# Patient Record
Sex: FEMALE | URBAN - METROPOLITAN AREA
[De-identification: names, ages, dates, MRNs, and addresses within clinical notes are randomized per-mention and may not be internally consistent; named-entity substitution may affect disease eponyms.]

---

## 2019-02-04 ENCOUNTER — LAB REQUISITION (OUTPATIENT)
Dept: LAB | Facility: HOSPITAL | Age: 24
End: 2019-02-04
Payer: COMMERCIAL

## 2019-02-04 DIAGNOSIS — Z34.83 ENCOUNTER FOR SUPERVISION OF OTHER NORMAL PREGNANCY, THIRD TRIMESTER: ICD-10-CM

## 2019-02-04 PROCEDURE — 87653 STREP B DNA AMP PROBE: CPT | Performed by: OBSTETRICS & GYNECOLOGY

## 2019-02-06 LAB — GP B STREP DNA SPEC QL NAA+PROBE: NORMAL

## 2019-02-27 ENCOUNTER — HOSPITAL ENCOUNTER (OUTPATIENT)
Facility: HOSPITAL | Age: 24
Discharge: HOME | End: 2019-02-27
Attending: OBSTETRICS & GYNECOLOGY | Admitting: OBSTETRICS & GYNECOLOGY
Payer: COMMERCIAL

## 2019-02-27 VITALS — HEART RATE: 81 BPM | DIASTOLIC BLOOD PRESSURE: 69 MMHG | TEMPERATURE: 98.3 F | SYSTOLIC BLOOD PRESSURE: 126 MMHG

## 2019-02-27 PROBLEM — O99.210 MATERNAL MORBID OBESITY, ANTEPARTUM (HCC): Status: ACTIVE | Noted: 2018-07-20

## 2019-02-27 PROBLEM — E66.01 MATERNAL MORBID OBESITY, ANTEPARTUM (HCC): Status: ACTIVE | Noted: 2018-07-20

## 2019-02-27 PROBLEM — O09.299 PREVIOUS STILLBIRTH OR DEMISE, ANTEPARTUM: Status: ACTIVE | Noted: 2018-07-20

## 2019-02-27 PROBLEM — Z3A.38 38 WEEKS GESTATION OF PREGNANCY: Status: ACTIVE | Noted: 2019-02-27

## 2019-02-27 LAB
ALBUMIN SERPL BCP-MCNC: 2.7 G/DL (ref 3.5–5)
ALP SERPL-CCNC: 101 U/L (ref 46–116)
ALT SERPL W P-5'-P-CCNC: 16 U/L (ref 12–78)
ANION GAP SERPL CALCULATED.3IONS-SCNC: 14 MMOL/L (ref 4–13)
AST SERPL W P-5'-P-CCNC: 17 U/L (ref 5–45)
BILIRUB SERPL-MCNC: 0.51 MG/DL (ref 0.2–1)
BILIRUB UR QL STRIP: NEGATIVE
BUN SERPL-MCNC: 8 MG/DL (ref 5–25)
CALCIUM SERPL-MCNC: 9.2 MG/DL (ref 8.3–10.1)
CHLORIDE SERPL-SCNC: 104 MMOL/L (ref 100–108)
CLARITY UR: NORMAL
CO2 SERPL-SCNC: 21 MMOL/L (ref 21–32)
COLOR UR: YELLOW
CREAT SERPL-MCNC: 0.58 MG/DL (ref 0.6–1.3)
CREAT UR-MCNC: 120 MG/DL
ERYTHROCYTE [DISTWIDTH] IN BLOOD BY AUTOMATED COUNT: 14.2 % (ref 11.6–15.1)
GFR SERPL CREATININE-BSD FRML MDRD: 129 ML/MIN/1.73SQ M
GLUCOSE SERPL-MCNC: 75 MG/DL (ref 65–140)
GLUCOSE UR STRIP-MCNC: NEGATIVE MG/DL
HCT VFR BLD AUTO: 39 % (ref 34.8–46.1)
HGB BLD-MCNC: 13 G/DL (ref 11.5–15.4)
HGB UR QL STRIP.AUTO: NEGATIVE
KETONES UR STRIP-MCNC: NEGATIVE MG/DL
LEUKOCYTE ESTERASE UR QL STRIP: NEGATIVE
MCH RBC QN AUTO: 27.8 PG (ref 26.8–34.3)
MCHC RBC AUTO-ENTMCNC: 33.3 G/DL (ref 31.4–37.4)
MCV RBC AUTO: 83 FL (ref 82–98)
NITRITE UR QL STRIP: NEGATIVE
PH UR STRIP.AUTO: 7 [PH] (ref 4.5–8)
PLATELET # BLD AUTO: 325 THOUSANDS/UL (ref 149–390)
PMV BLD AUTO: 10.3 FL (ref 8.9–12.7)
POTASSIUM SERPL-SCNC: 3.6 MMOL/L (ref 3.5–5.3)
PROT SERPL-MCNC: 6.9 G/DL (ref 6.4–8.2)
PROT UR STRIP-MCNC: NEGATIVE MG/DL
PROT UR-MCNC: 14 MG/DL
PROT/CREAT UR: 0.12 MG/G{CREAT} (ref 0–0.1)
RBC # BLD AUTO: 4.68 MILLION/UL (ref 3.81–5.12)
SODIUM SERPL-SCNC: 139 MMOL/L (ref 136–145)
SP GR UR STRIP.AUTO: 1.02 (ref 1–1.03)
UROBILINOGEN UR QL STRIP.AUTO: 0.2 E.U./DL
WBC # BLD AUTO: 10.94 THOUSAND/UL (ref 4.31–10.16)

## 2019-02-27 PROCEDURE — 85027 COMPLETE CBC AUTOMATED: CPT | Performed by: STUDENT IN AN ORGANIZED HEALTH CARE EDUCATION/TRAINING PROGRAM

## 2019-02-27 PROCEDURE — 82570 ASSAY OF URINE CREATININE: CPT | Performed by: STUDENT IN AN ORGANIZED HEALTH CARE EDUCATION/TRAINING PROGRAM

## 2019-02-27 PROCEDURE — G0463 HOSPITAL OUTPT CLINIC VISIT: HCPCS

## 2019-02-27 PROCEDURE — 84156 ASSAY OF PROTEIN URINE: CPT | Performed by: STUDENT IN AN ORGANIZED HEALTH CARE EDUCATION/TRAINING PROGRAM

## 2019-02-27 PROCEDURE — 80053 COMPREHEN METABOLIC PANEL: CPT | Performed by: STUDENT IN AN ORGANIZED HEALTH CARE EDUCATION/TRAINING PROGRAM

## 2019-02-27 PROCEDURE — 99213 OFFICE O/P EST LOW 20 MIN: CPT

## 2019-02-27 PROCEDURE — 81003 URINALYSIS AUTO W/O SCOPE: CPT | Performed by: STUDENT IN AN ORGANIZED HEALTH CARE EDUCATION/TRAINING PROGRAM

## 2019-02-27 NOTE — PROGRESS NOTES
Triage Note - OB  Rosalinda Olguin 20 y o  female MRN: [id number]  Unit/Bed#: L&D 323-01 Encounter: [id number]    Chief Complaint: Contraction pain and elevated blood pressures in office  KIM: Estimated Date of Delivery: None noted  HPI: Patient is a  at 38 weeks and 5 days by LMP presenting to triage after being sent from office for elevated blood pressures with diastolics in the 84N  Patient denies any current headaches but reports she did have a headache this past weekend  She denies any visual disturbance/scotoma, right upper quadrant or epigastric pain, chest pain, shortness of breath, nausea, vomiting, hand or face swelling  She reports she began having contractions around 3:00 p m  that she feels are every 2-3 minutes apart  She was checked in office and was noted to be 3/60/-4  She denies any loss of fluid or vaginal bleeding  She reports good fetal movement  Mal Calvert is a patient of Dr Kinza Khanna and has an obstetric history complicated by IUFD at 21 weeks and morbid obesity  Vitals:   /67   Pulse 76   Temp 98 3 °F (36 8 °C)   LMP 2018   There is no height or weight on file to calculate BMI      Physical Exam  GEN:  Appears calm, uncomfortable with contractions, no acute distress  CARDIO: RRR, no murmur, no gallop  RESP:  Clear to auscultation bilaterally, good air entry in all fields  ABD:  Soft, nontender, gravid uterus, palpable contractions  Neuro:  DTRs +1, negative for clonus  SVE: Dilation: 3  Effacement (%): 60  Cervical Characteristics: Anterior  Station: -2  Presentation: Vertex  Method: Manual  OB Examiner: Ekonomidis    FHT:  Baseline Rate: 140 bpm  Variability: Moderate 6-25 bpm  Accelerations: 15 x 15 or greater, At variable times  Decelerations: None  FHR Category: Category I  TOCO:   Contraction Frequency (minutes): 1 5-2 5  Contraction Duration (seconds):   Contraction Quality: Mild    Labs:   Recent Results (from the past 24 hour(s))   CBC and Platelet Collection Time: 19  6:02 PM   Result Value Ref Range    WBC 10 94 (H) 4 31 - 10 16 Thousand/uL    RBC 4 68 3 81 - 5 12 Million/uL    Hemoglobin 13 0 11 5 - 15 4 g/dL    Hematocrit 39 0 34 8 - 46 1 %    MCV 83 82 - 98 fL    MCH 27 8 26 8 - 34 3 pg    MCHC 33 3 31 4 - 37 4 g/dL    RDW 14 2 11 6 - 15 1 %    Platelets 779 650 - 950 Thousands/uL    MPV 10 3 8 9 - 12 7 fL   Comprehensive metabolic panel    Collection Time: 19  6:02 PM   Result Value Ref Range    Sodium 139 136 - 145 mmol/L    Potassium 3 6 3 5 - 5 3 mmol/L    Chloride 104 100 - 108 mmol/L    CO2 21 21 - 32 mmol/L    ANION GAP 14 (H) 4 - 13 mmol/L    BUN 8 5 - 25 mg/dL    Creatinine 0 58 (L) 0 60 - 1 30 mg/dL    Glucose 75 65 - 140 mg/dL    Calcium 9 2 8 3 - 10 1 mg/dL    AST 17 5 - 45 U/L    ALT 16 12 - 78 U/L    Alkaline Phosphatase 101 46 - 116 U/L    Total Protein 6 9 6 4 - 8 2 g/dL    Albumin 2 7 (L) 3 5 - 5 0 g/dL    Total Bilirubin 0 51 0 20 - 1 00 mg/dL    eGFR 129 ml/min/1 73sq m   UA (URINE) with reflex to Microscopic    Collection Time: 19  6:02 PM   Result Value Ref Range    Color, UA Yellow     Clarity, UA Slightly Cloudy     Specific [place], UA 1 020 1 003 - 1 030    pH, UA 7 0 4 5 - 8 0    Leukocytes, UA Negative Negative    Nitrite, UA Negative Negative    Protein, UA Negative Negative mg/dl    Glucose, UA Negative Negative mg/dl    Ketones, UA Negative Negative mg/dl    Urobilinogen, UA 0 2 0 2, 1 0 E U /dl E U /dl    Bilirubin, UA Negative Negative    Blood, UA Negative Negative     Lab, Imaging and other studies: I have personally reviewed pertinent reports      A/P:  68-year-old  120 at 38 weeks and 5 days presenting for rule out preeclampsia and rule out labor  1) Rule out preeclampsia   - Blood pressures Q 30 minutes:  Bps: 120-130s/60-70s   - Preeclamptic labs:  CBC, CMP, UA, protein creatinine ratio  2) Rule out labor   - eFHM and toco   - minimal cervical change from office check- will recheck in 2 hours given frequent contractions and discomfort patient  Windy Reese MD  2/27/2019  6:57 PM    Reassessment at 2100:  Patient continues to report contraction pain  FHT reactive with baseline of 140, modearte variability and 15x15 accelerations, no decelerations noted  Cervix is unchanged from last cervical check  BP taken and was 126/ 69  Discussed labor precautions and to please call her provider with any changes  Discharge instructions given to patient and patient told to call office to reschedule appointment to be seen early next week    D/W Dr Tran Chamorro MD  02/27/19  9:11 PM

## 2019-03-01 ENCOUNTER — ANESTHESIA EVENT (INPATIENT)
Dept: LABOR AND DELIVERY | Facility: HOSPITAL | Age: 24
End: 2019-03-01
Payer: COMMERCIAL

## 2019-03-01 ENCOUNTER — HOSPITAL ENCOUNTER (INPATIENT)
Facility: HOSPITAL | Age: 24
LOS: 2 days | Discharge: HOME | End: 2019-03-03
Attending: OBSTETRICS & GYNECOLOGY | Admitting: OBSTETRICS & GYNECOLOGY
Payer: COMMERCIAL

## 2019-03-01 ENCOUNTER — ANESTHESIA (INPATIENT)
Dept: LABOR AND DELIVERY | Facility: HOSPITAL | Age: 24
End: 2019-03-01
Payer: COMMERCIAL

## 2019-03-01 DIAGNOSIS — Z3A.39 39 WEEKS GESTATION OF PREGNANCY: ICD-10-CM

## 2019-03-01 PROBLEM — J45.20 MILD INTERMITTENT ASTHMA: Status: ACTIVE | Noted: 2019-03-01

## 2019-03-01 LAB
ABO GROUP BLD: NORMAL
BASE EXCESS BLDCOA CALC-SCNC: 0.2 MMOL/L (ref 3–11)
BASE EXCESS BLDCOV CALC-SCNC: -2.5 MMOL/L (ref 1–9)
BASOPHILS # BLD AUTO: 0.04 THOUSANDS/ΜL (ref 0–0.1)
BASOPHILS NFR BLD AUTO: 0 % (ref 0–1)
BLD GP AB SCN SERPL QL: NEGATIVE
EOSINOPHIL # BLD AUTO: 0.1 THOUSAND/ΜL (ref 0–0.61)
EOSINOPHIL NFR BLD AUTO: 1 % (ref 0–6)
ERYTHROCYTE [DISTWIDTH] IN BLOOD BY AUTOMATED COUNT: 14.3 % (ref 11.6–15.1)
HCO3 BLDCOA-SCNC: 24.9 MMOL/L (ref 17.3–27.3)
HCO3 BLDCOV-SCNC: 20.1 MMOL/L (ref 12.2–28.6)
HCT VFR BLD AUTO: 39.3 % (ref 34.8–46.1)
HGB BLD-MCNC: 12.8 G/DL (ref 11.5–15.4)
IMM GRANULOCYTES # BLD AUTO: 0.05 THOUSAND/UL (ref 0–0.2)
IMM GRANULOCYTES NFR BLD AUTO: 1 % (ref 0–2)
LYMPHOCYTES # BLD AUTO: 2.24 THOUSANDS/ΜL (ref 0.6–4.47)
LYMPHOCYTES NFR BLD AUTO: 22 % (ref 14–44)
MCH RBC QN AUTO: 26.8 PG (ref 26.8–34.3)
MCHC RBC AUTO-ENTMCNC: 32.6 G/DL (ref 31.4–37.4)
MCV RBC AUTO: 82 FL (ref 82–98)
MONOCYTES # BLD AUTO: 0.97 THOUSAND/ΜL (ref 0.17–1.22)
MONOCYTES NFR BLD AUTO: 10 % (ref 4–12)
NEUTROPHILS # BLD AUTO: 6.63 THOUSANDS/ΜL (ref 1.85–7.62)
NEUTS SEG NFR BLD AUTO: 66 % (ref 43–75)
NRBC BLD AUTO-RTO: 0 /100 WBCS
O2 CT VFR BLDCOA CALC: 10.1 ML/DL
OXYHGB MFR BLDCOA: 54.2 %
OXYHGB MFR BLDCOV: 70.2 %
PCO2 BLDCOA: 41 MM[HG] (ref 30–60)
PCO2 BLDCOV: 29.2 MM HG (ref 27–43)
PH BLDCOA: 7.4 [PH] (ref 7.23–7.43)
PH BLDCOV: 7.46 [PH] (ref 7.19–7.49)
PLATELET # BLD AUTO: 348 THOUSANDS/UL (ref 149–390)
PMV BLD AUTO: 10.4 FL (ref 8.9–12.7)
PO2 BLDCOA: 23.3 MM HG (ref 5–25)
PO2 BLDCOV: 28.4 MM HG (ref 15–45)
RBC # BLD AUTO: 4.78 MILLION/UL (ref 3.81–5.12)
RH BLD: POSITIVE
RPR SER QL: NORMAL
SAO2 % BLDCOV: 14.2 ML/DL
SPECIMEN EXPIRATION DATE: NORMAL
WBC # BLD AUTO: 10.03 THOUSAND/UL (ref 4.31–10.16)

## 2019-03-01 PROCEDURE — 86850 RBC ANTIBODY SCREEN: CPT | Performed by: STUDENT IN AN ORGANIZED HEALTH CARE EDUCATION/TRAINING PROGRAM

## 2019-03-01 PROCEDURE — 10H07YZ INSERTION OF OTHER DEVICE INTO PRODUCTS OF CONCEPTION, VIA NATURAL OR ARTIFICIAL OPENING: ICD-10-PCS | Performed by: OBSTETRICS & GYNECOLOGY

## 2019-03-01 PROCEDURE — 99213 OFFICE O/P EST LOW 20 MIN: CPT

## 2019-03-01 PROCEDURE — 85025 COMPLETE CBC W/AUTO DIFF WBC: CPT | Performed by: STUDENT IN AN ORGANIZED HEALTH CARE EDUCATION/TRAINING PROGRAM

## 2019-03-01 PROCEDURE — 4A1HXCZ MONITORING OF PRODUCTS OF CONCEPTION, CARDIAC RATE, EXTERNAL APPROACH: ICD-10-PCS | Performed by: OBSTETRICS & GYNECOLOGY

## 2019-03-01 PROCEDURE — 0KQM0ZZ REPAIR PERINEUM MUSCLE, OPEN APPROACH: ICD-10-PCS | Performed by: OBSTETRICS & GYNECOLOGY

## 2019-03-01 PROCEDURE — 86900 BLOOD TYPING SEROLOGIC ABO: CPT | Performed by: STUDENT IN AN ORGANIZED HEALTH CARE EDUCATION/TRAINING PROGRAM

## 2019-03-01 PROCEDURE — G0463 HOSPITAL OUTPT CLINIC VISIT: HCPCS

## 2019-03-01 PROCEDURE — 86592 SYPHILIS TEST NON-TREP QUAL: CPT | Performed by: STUDENT IN AN ORGANIZED HEALTH CARE EDUCATION/TRAINING PROGRAM

## 2019-03-01 PROCEDURE — 82805 BLOOD GASES W/O2 SATURATION: CPT | Performed by: OBSTETRICS & GYNECOLOGY

## 2019-03-01 PROCEDURE — 86901 BLOOD TYPING SEROLOGIC RH(D): CPT | Performed by: STUDENT IN AN ORGANIZED HEALTH CARE EDUCATION/TRAINING PROGRAM

## 2019-03-01 RX ORDER — DIAPER,BRIEF,INFANT-TODD,DISP
1 EACH MISCELLANEOUS AS NEEDED
Status: DISCONTINUED | OUTPATIENT
Start: 2019-03-01 | End: 2019-03-04 | Stop reason: HOSPADM

## 2019-03-01 RX ORDER — ROPIVACAINE HYDROCHLORIDE 2 MG/ML
INJECTION, SOLUTION EPIDURAL; INFILTRATION; PERINEURAL AS NEEDED
Status: DISCONTINUED | OUTPATIENT
Start: 2019-03-01 | End: 2019-03-01 | Stop reason: SURG

## 2019-03-01 RX ORDER — OXYTOCIN/RINGER'S LACTATE 30/500 ML
250 PLASTIC BAG, INJECTION (ML) INTRAVENOUS
Status: ACTIVE | OUTPATIENT
Start: 2019-03-01 | End: 2019-03-01

## 2019-03-01 RX ORDER — ONDANSETRON 2 MG/ML
4 INJECTION INTRAMUSCULAR; INTRAVENOUS EVERY 8 HOURS PRN
Status: DISCONTINUED | OUTPATIENT
Start: 2019-03-01 | End: 2019-03-04 | Stop reason: HOSPADM

## 2019-03-01 RX ORDER — OXYTOCIN/RINGER'S LACTATE 30/500 ML
1-30 PLASTIC BAG, INJECTION (ML) INTRAVENOUS
Status: DISCONTINUED | OUTPATIENT
Start: 2019-03-01 | End: 2019-03-01

## 2019-03-01 RX ORDER — ROPIVACAINE HYDROCHLORIDE 2 MG/ML
INJECTION, SOLUTION EPIDURAL; INFILTRATION; PERINEURAL
Status: COMPLETED
Start: 2019-03-01 | End: 2019-03-01

## 2019-03-01 RX ORDER — SIMETHICONE 80 MG
80 TABLET,CHEWABLE ORAL 4 TIMES DAILY PRN
Status: DISCONTINUED | OUTPATIENT
Start: 2019-03-01 | End: 2019-03-04 | Stop reason: HOSPADM

## 2019-03-01 RX ORDER — IBUPROFEN 600 MG/1
600 TABLET ORAL EVERY 6 HOURS PRN
Status: DISCONTINUED | OUTPATIENT
Start: 2019-03-01 | End: 2019-03-04 | Stop reason: HOSPADM

## 2019-03-01 RX ORDER — ACETAMINOPHEN 325 MG/1
650 TABLET ORAL EVERY 6 HOURS PRN
Status: DISCONTINUED | OUTPATIENT
Start: 2019-03-01 | End: 2019-03-04 | Stop reason: HOSPADM

## 2019-03-01 RX ORDER — OXYCODONE HYDROCHLORIDE AND ACETAMINOPHEN 5; 325 MG/1; MG/1
2 TABLET ORAL EVERY 4 HOURS PRN
Status: DISCONTINUED | OUTPATIENT
Start: 2019-03-01 | End: 2019-03-02

## 2019-03-01 RX ORDER — DIPHENHYDRAMINE HCL 25 MG
25 TABLET ORAL EVERY 6 HOURS PRN
Status: DISCONTINUED | OUTPATIENT
Start: 2019-03-01 | End: 2019-03-04 | Stop reason: HOSPADM

## 2019-03-01 RX ORDER — CALCIUM CARBONATE 200(500)MG
1000 TABLET,CHEWABLE ORAL DAILY PRN
Status: DISCONTINUED | OUTPATIENT
Start: 2019-03-01 | End: 2019-03-04 | Stop reason: HOSPADM

## 2019-03-01 RX ORDER — OXYTOCIN/RINGER'S LACTATE 30/500 ML
PLASTIC BAG, INJECTION (ML) INTRAVENOUS
Status: COMPLETED
Start: 2019-03-01 | End: 2019-03-01

## 2019-03-01 RX ORDER — SODIUM CHLORIDE, SODIUM LACTATE, POTASSIUM CHLORIDE, CALCIUM CHLORIDE 600; 310; 30; 20 MG/100ML; MG/100ML; MG/100ML; MG/100ML
125 INJECTION, SOLUTION INTRAVENOUS CONTINUOUS
Status: DISCONTINUED | OUTPATIENT
Start: 2019-03-01 | End: 2019-03-04 | Stop reason: HOSPADM

## 2019-03-01 RX ORDER — DOCUSATE SODIUM 100 MG/1
100 CAPSULE, LIQUID FILLED ORAL 2 TIMES DAILY
Status: DISCONTINUED | OUTPATIENT
Start: 2019-03-01 | End: 2019-03-04 | Stop reason: HOSPADM

## 2019-03-01 RX ORDER — ALBUTEROL SULFATE 90 UG/1
2 AEROSOL, METERED RESPIRATORY (INHALATION) EVERY 6 HOURS PRN
Status: DISCONTINUED | OUTPATIENT
Start: 2019-03-01 | End: 2019-03-04 | Stop reason: HOSPADM

## 2019-03-01 RX ORDER — OXYCODONE HYDROCHLORIDE AND ACETAMINOPHEN 5; 325 MG/1; MG/1
1 TABLET ORAL EVERY 4 HOURS PRN
Status: DISCONTINUED | OUTPATIENT
Start: 2019-03-01 | End: 2019-03-02

## 2019-03-01 RX ORDER — ONDANSETRON 2 MG/ML
4 INJECTION INTRAMUSCULAR; INTRAVENOUS EVERY 6 HOURS PRN
Status: DISCONTINUED | OUTPATIENT
Start: 2019-03-01 | End: 2019-03-01

## 2019-03-01 RX ORDER — LIDOCAINE HYDROCHLORIDE 10 MG/ML
INJECTION, SOLUTION INFILTRATION; PERINEURAL
Status: COMPLETED | OUTPATIENT
Start: 2019-03-01 | End: 2019-03-01

## 2019-03-01 RX ORDER — TERBUTALINE SULFATE 1 MG/ML
INJECTION, SOLUTION SUBCUTANEOUS
Status: DISPENSED
Start: 2019-03-01 | End: 2019-03-01

## 2019-03-01 RX ORDER — ROPIVACAINE HYDROCHLORIDE 5 MG/ML
INJECTION, SOLUTION EPIDURAL; INFILTRATION; PERINEURAL AS NEEDED
Status: DISCONTINUED | OUTPATIENT
Start: 2019-03-01 | End: 2019-03-01 | Stop reason: SURG

## 2019-03-01 RX ADMIN — IBUPROFEN 600 MG: 600 TABLET ORAL at 19:02

## 2019-03-01 RX ADMIN — SODIUM CHLORIDE, SODIUM LACTATE, POTASSIUM CHLORIDE, AND CALCIUM CHLORIDE 999 ML/HR: .6; .31; .03; .02 INJECTION, SOLUTION INTRAVENOUS at 10:54

## 2019-03-01 RX ADMIN — WITCH HAZEL 1 PAD: 500 SOLUTION RECTAL; TOPICAL at 19:39

## 2019-03-01 RX ADMIN — ROPIVACAINE HYDROCHLORIDE 5 ML: 2 INJECTION, SOLUTION EPIDURAL; INFILTRATION at 06:46

## 2019-03-01 RX ADMIN — HYDROCORTISONE 1 APPLICATION: 1 CREAM TOPICAL at 19:39

## 2019-03-01 RX ADMIN — ROPIVACAINE HYDROCHLORIDE: 2 INJECTION, SOLUTION EPIDURAL; INFILTRATION at 06:56

## 2019-03-01 RX ADMIN — SODIUM CHLORIDE, SODIUM LACTATE, POTASSIUM CHLORIDE, AND CALCIUM CHLORIDE 999 ML/HR: .6; .31; .03; .02 INJECTION, SOLUTION INTRAVENOUS at 05:51

## 2019-03-01 RX ADMIN — BENZOCAINE AND LEVOMENTHOL: 200; 5 SPRAY TOPICAL at 19:39

## 2019-03-01 RX ADMIN — ROPIVACAINE HYDROCHLORIDE 5 ML: 2 INJECTION, SOLUTION EPIDURAL; INFILTRATION at 06:49

## 2019-03-01 RX ADMIN — LIDOCAINE HYDROCHLORIDE 2 ML: 10 INJECTION, SOLUTION INFILTRATION; PERINEURAL at 06:55

## 2019-03-01 RX ADMIN — Medication 2 MILLI-UNITS/MIN: at 08:31

## 2019-03-01 RX ADMIN — ROPIVACAINE HYDROCHLORIDE 6 ML: 5 INJECTION, SOLUTION EPIDURAL; INFILTRATION; PERINEURAL at 10:44

## 2019-03-01 RX ADMIN — SODIUM CHLORIDE, SODIUM LACTATE, POTASSIUM CHLORIDE, AND CALCIUM CHLORIDE 999 ML/HR: .6; .31; .03; .02 INJECTION, SOLUTION INTRAVENOUS at 06:37

## 2019-03-01 NOTE — PLAN OF CARE
Problem: POSTPARTUM  Goal: Experiences normal postpartum course  Description  INTERVENTIONS:  - Monitor maternal vital signs  - Assess uterine involution and lochia  Outcome: Progressing  Goal: Appropriate maternal -  bonding  Description  INTERVENTIONS:  - Identify family support  - Assess for appropriate maternal/infant bonding   -Encourage maternal/infant bonding opportunities  - Referral to  or  as needed  Outcome: Progressing  Goal: Establishment of infant feeding pattern  Description  INTERVENTIONS:  - Assess breast/bottle feeding  - Refer to lactation as needed  Outcome: Progressing  Goal: Incision(s), wounds(s) or drain site(s) healing without S/S of infection  Description  INTERVENTIONS  - Assess and document risk factors for skin impairment   - Assess and document dressing, incision, wound bed, drain sites and surrounding tissue  - Initiate Nutrition services consult and/or wound management as needed  Outcome: Progressing     Problem: ANTEPARTUM  Goal: Maintain pregnancy as long as maternal and/or fetal condition is stable  Description  INTERVENTIONS:  - Maternal surveillance  - Fetal surveillance  - Monitor uterine activity  - Medications as ordered  - Bedrest  Outcome: Completed     Problem: BIRTH - VAGINAL/ SECTION  Goal: Fetal and maternal status remain reassuring during the birth process  Description  INTERVENTIONS:  - Monitor vital signs  - Monitor fetal heart rate  - Monitor uterine activity  - Monitor labor progression (vaginal delivery)  - DVT prophylaxis  - Antibiotic prophylaxis  Outcome: Completed  Goal: Emotionally satisfying birthing experience for mother/fetus  Description  Interventions:  - Assess, plan, implement and evaluate the nursing care given to the patient in labor  - Advocate the philosophy that each childbirth experience is a unique experience and support the family's chosen level of involvement and control during the labor process   - Actively participate in both the patient's and family's teaching of the birth process  - Consider cultural, Latter day and age-specific factors and plan care for the patient in labor  Outcome: Completed     Problem: Knowledge Deficit  Goal: Verbalizes understanding of labor plan  Description  Assess patient/family/caregiver's baseline knowledge level and ability to understand information  Provide education via patient/family/caregiver's preferred learning method at appropriate level of understanding  1  Provide teaching at level of understanding  2  Provide teaching via preferred learning method(s)  Outcome: Completed     Problem: Labor & Delivery  Goal: Manages discomfort  Description  Assess and monitor for signs and symptoms of discomfort  Assess patient's pain level regularly and per hospital policy  Administer medications as ordered  Support use of nonpharmacological methods to help control pain such as distraction, imagery, relaxation, and application of heat and cold  Collaborate with interdisciplinary team and patient to determine appropriate pain management plan  1  Include patient in decisions related to comfort  2  Offer non-pharmacological pain management interventions  3  Report ineffective pain management to physician  Outcome: Completed  Goal: Patient vital signs are stable  Description  1  Assess vital signs - vaginal delivery    Outcome: Completed

## 2019-03-01 NOTE — OB LABOR/OXYTOCIN SAFETY PROGRESS
Labor Progress Note - Gentry Shen 20 y o  female MRN: [id number]    Unit/Bed#: L&D 323-01 Encounter: [id number]    OB History    Para Term  AB Living   4 1 0 1 2 0   SAB TAB Ectopic Multiple Live Births   1 0 0 0 0     Gestational Age: 39w0d     Contraction Frequency (minutes): 2-4  Contraction Quality: Strong  Tachysystole: No   Dilation: 4-5        Effacement (%): 80  Station: -2  Baseline Rate: 150 bpm     FHR Category: Category I          Notes/comments:      Pt unchanged, Dr Marco Campos will be updated  Possible plan to start pitocin and IUPC if necessary         Alina Cunningham DO 3/1/2019 7:50 AM

## 2019-03-01 NOTE — OB LABOR/OXYTOCIN SAFETY PROGRESS
Oxytocin Safety Progress Check Note - Nayeli Nuñez 20 y o  female MRN: [id number]    Unit/Bed#: L&D 323-01 Encounter: [id number]    OB History    Para Term  AB Living   4 1 0 1 2 0   SAB TAB Ectopic Multiple Live Births   1 0 0 0 0     Gestational Age: 39w0d  Dose (pj-units/min) Oxytocin: 3 pj-units/min  Contraction Frequency (minutes): 2-4  Contraction Quality: Strong  Tachysystole: No   Dilation: Lip/rim (Comment)        Effacement (%): 100  Station: 1  Baseline Rate: 140 bpm     FHR Category: Category II          Notes/comments:     SVE 6/90/0  Patient began having late decelerations and minimal variability  Pitocin stopped and FSE/IUPC placed to assist in monitoring  IVF bolus given, O2 mask  Patient also very uncomfortable, will call anesthesia to rebolus epidural     Addendum:  Patient again had another deceleration to the 90s, lasted about 2 mins  SVE now anterior lip, +1  Patient on her side, deceleration resolved  Dr Romero Memos aware and on her way      Julio Cesar Li MD 3/1/2019 10:45 AM

## 2019-03-01 NOTE — L&D DELIVERY NOTE
Vaginal Delivery Summary - OB/GYN   Demetria Fischer 20 y o  female MRN: [id number]  Unit/Bed#: L&D 323-01 Encounter: [id number]          Pre-delivery Diagnosis:   1  Pregnancy at 39w0d   2  SROM  3  Hx of IUFD at 24 weeks  4  Obesity  5  Elevated 1hr GTT, normal 3hr GTT  6  Asthma    Post-delivery Diagnosis: same, delivered; shoulder dystocia    Procedure: Spontaneous Vaginal Delivery, suprapubic pressure and posterior shoulder delivery    Attending: Irma    Assistant(s): Sindi    Anesthesia: Epidural Dr Schroeder Custard: [id number]    Complications: none apparent    Specimens:   1  Arterial and venous cord gases  2  Cord blood  3  Segment of umbilical cord  4  Placenta to storage     Findings:  1  Viable male at 1228, with APGARS of 6 and 9 at 1 and 5 minutes respectively,  2  Spontaneous delivery of intact placenta at 1236  3  2nd degree laceration repaired with w/ 2-0 Vicryl and 3-0 vicryl   4  Blood gases:   Arterial pH: 7 402   Arterial base excess: 0 2   Venous pH: 4 455   Venous base excess: -2 5  5  Weight 8lb 5 oz  Disposition:  Patient tolerated the procedure well and was recovering in labor and delivery room       Brief history and labor course:  Ms Demetria Fischer is a 20 y o  Reggy Glaze now - at 39wk0d  She presented to labor and delivery for SROM  Her pregnancy was complicated by obesity, previous hx of IUFD at 21 weeks and elevated 1hr GTT  She was augmented with pitocin 2 hrs after admission with no cervical change  She received an epidural for analgesia  FHR decelerations required discontinuation of pitocin  Pt progressed from 5-9cm quickly  Description of procedure:    After pushing for 75 minutes, at 1228 patient delivered a viable female , wt 8lb 5oz, apgars of 6 (1 min) and 9 (5 min)  The fetal vertex delivered spontaneously  There was a loose nuchal cord that was easily reduced   Upon attempt to deliver the right anterior shoulder with gentle downward traction, a shoulder dystocia was immediately recognized  The patient was placed in Cindy position and suprapubic pressure was applied  The anterior right shoulder failed to deliver  The posterior arm was palpated and was easily delivered  The remainder of the fetus then delivered spontaneously  Upon delivery, the infant was placed on the mothers abdomen and the cord was clamped and cut after 30 second delay  The infant was noted to cry spontaneously and was moving all extremities appropriately  There was no evidence for injury  Awaiting nurse resuscitators evaluated the   Arterial and venous cord blood gases and cord blood was collected for analysis  These were promptly sent to the lab  In the immediate post-partum, 30 units of IV pitocin was administered, and the uterus was noted to contract down well with massage and pitocin  The placenta delivered spontaneously at 1236 and was noted to have a centrally inserted 3 vessel cord  The vagina, cervix, perineum, and rectum were inspected and there was noted to be a 2nd degree laceration  Laceration Repair  Patient was comfortable with epidural at that time  A 2nd degree laceration was identified and required repair  2-0 Vicryl was used to reapproximate the vagina and perform the crown stitch, the 3-0 Vicryl was used for the remainder of the repair  Several superficial vaginal epithelial abrasions, distinct from second degree laceration, were not hemostatic  3-0 vicryl was used to close these areas  Good hemostasis was confirmed at the conclusion of this procedure  At the conclusion of the procedure, all needle, sponge, and instrument counts were noted to be correct  Patient tolerated the procedure well and was allowed to recover in labor and delivery room with family and  before being transferred to the post-partum floor  Dr Devendra Mcknight was present and participated in all key portions of the case        Dinae Byrd MD  OB/GYN PGY-1  3/1/2019  1:36 PM

## 2019-03-01 NOTE — OB LABOR/OXYTOCIN SAFETY PROGRESS
Oxytocin Safety Progress Check Note - Katelin Bhakta 20 y o  female MRN: [id number]    Unit/Bed#: L&D 323-01 Encounter: [id number]    OB History    Para Term  AB Living   4 1 0 1 2 0   SAB TAB Ectopic Multiple Live Births   1 0 0 0 0     Gestational Age: 39w0d  Dose (pj-units/min) Oxytocin: 3 pj-units/min  Contraction Frequency (minutes): 2-4  Contraction Quality: Strong  Tachysystole: No   Dilation: 6        Effacement (%): 90  Station: 0  Baseline Rate: 135 bpm     FHR Category: Category I          Notes/comments:     SVE now 90/0  Cat I tracing  Continue titrating pitocin going up by 2 every 20 minutes  Dr Magana Poag aware        Steven Yee MD 3/1/2019 10:02 AM

## 2019-03-01 NOTE — LACTATION NOTE
This note was copied from a baby's chart  Baby nursed after delivery, sleeping at this time  A lot of visitors in  Instructed to call in to review Ready, Set, Baby book and observe next feeding  Encouraged parents to call for assistance, questions, and concerns about breastfeeding  Extension provided

## 2019-03-01 NOTE — DISCHARGE SUMMARY
Discharge Summary - Ana Amador 20 y o  female MRN: [id number]    Unit/Bed#: L&D 323-01 Encounter: [id number]    Admission Date: 3/1/2019     Discharge Date: 03/3/19    Admitting Diagnosis:   1  Pregnancy at 39w0d  2  SROM  3  Induction of labor  4  IUFD at [facility]  5  Class III obesity  6  Elevated 1hr GTT, normal 3h  7  Asthma  8  Single fetus    Discharge Diagnosis: same, delivered    Procedures: spontaneous vaginal delivery    Admitting Attending: Wesly Bronson MD  [facility]:     Ana Amador is a 20 y o  [id number] at 39w0d wks who was initially admitted for PROM  She was started on pitocin titration  She started having recurrent late decelerations that resolve with conservative measures and turning the pitocin off  FSE was placed and FHT started becoming category 2 with recurrent late again  IUPC was placed and she made rapid change to completion and began to push  She delivered a viable male  on 3/1/19 at 1228  Weight 8lbs 5oz via spontaneous vaginal delivery  Apgars were 6 (1 min) and 9 (5 min)   was transferred to  nursery  Patient tolerated the procedure well and was transferred to recovery in stable condition  Her postpartum course was uncomplicated  Her postpartum pain was well controlled with oral analgesics  On day of discharge, she was ambulating and able to reasonably perform all ADLs  She was voiding and had appropriate bowel function  Pain was well controlled  She was discharged home on post-partum day #2 without complications  Patient was instructed to follow up with her OB as an outpatient and was given appropriate warnings to call provider if she develops signs of infection or uncontrolled pain  Complications: none apparent    Condition at discharge: stable     Discharge Medications:   Prenatal vitamin daily for 6 months or the duration of nursing whichever is longer    Motrin 600 mg orally every 6 hours as needed for pain  Tylenol (over the counter) per bottle directions as needed for pain  Hydrocortisone cream 1% (over the counter) applied 1-2x daily to hemorrhoids as needed  Witch hazel pads for hemorrhoidal discomfort as needed    Discharge instructions :   -Do not place anything (no partner, tampons or douche) in your vagina for 6 weeks  -You may walk for exercise for the first 6 weeks then gradually return to your usual activities    -You may take baths or shower per your preference    -Please look at your bust (breasts) in the mirror daily and call provider for redness or tenderness or increased warmth  - If you have had a  please look at your incision daily as well and call provider for increasing redness or steady drainage from the incision    -Please call your provider if temperature > 100 4*F or 38* C, worsening pain or a foul discharge  Provisions for Follow-Up Care:  See after visit summary for information related to follow-up care and any pertinent home health orders        Disposition: Home    Planned Readmission: No

## 2019-03-01 NOTE — ANESTHESIA POSTPROCEDURE EVALUATION
Post-Op Assessment Note    CV Status:  Stable  Pain Score: 0    Pain management: adequate     Mental Status:  Alert and awake   Hydration Status:  Euvolemic   PONV Controlled:  Controlled   Airway Patency:  Patent   Post Op Vitals Reviewed: Yes      Staff: Anesthesiologist     Post-op block assessment: catheter intact and no complications        BP      Temp      Pulse     Resp      SpO2

## 2019-03-01 NOTE — PLAN OF CARE
Problem: ANTEPARTUM  Goal: Maintain pregnancy as long as maternal and/or fetal condition is stable  Description  INTERVENTIONS:  - Maternal surveillance  - Fetal surveillance  - Monitor uterine activity  - Medications as ordered  - Bedrest  Outcome: Progressing     Problem: BIRTH - VAGINAL/ SECTION  Goal: Fetal and maternal status remain reassuring during the birth process  Description  INTERVENTIONS:  - Monitor vital signs  - Monitor fetal heart rate  - Monitor uterine activity  - Monitor labor progression (vaginal delivery)  - DVT prophylaxis  - Antibiotic prophylaxis  Outcome: Progressing  Goal: Emotionally satisfying birthing experience for mother/fetus  Description  Interventions:  - Assess, plan, implement and evaluate the nursing care given to the patient in labor  - Advocate the philosophy that each childbirth experience is a unique experience and support the family's chosen level of involvement and control during the labor process   - Actively participate in both the patient's and family's teaching of the birth process  - Consider cultural, Jainism and age-specific factors and plan care for the patient in labor  Outcome: Progressing     Problem: Knowledge Deficit  Goal: Verbalizes understanding of labor plan  Description  Assess patient/family/caregiver's baseline knowledge level and ability to understand information  Provide education via patient/family/caregiver's preferred learning method at appropriate level of understanding  1  Provide teaching at level of understanding  2  Provide teaching via preferred learning method(s)  Outcome: Progressing     Problem: Labor & Delivery  Goal: Manages discomfort  Description  Assess and monitor for signs and symptoms of discomfort  Assess patient's pain level regularly and per hospital policy  Administer medications as ordered   Support use of nonpharmacological methods to help control pain such as distraction, imagery, relaxation, and application of heat and cold  Collaborate with interdisciplinary team and patient to determine appropriate pain management plan  1  Include patient in decisions related to comfort  2  Offer non-pharmacological pain management interventions  3  Report ineffective pain management to physician  Outcome: Progressing  Goal: Patient vital signs are stable  Description  1  Assess vital signs - vaginal delivery    Outcome: Progressing

## 2019-03-01 NOTE — DISCHARGE INSTRUCTIONS
Vaginal Delivery   WHAT YOU SHOULD KNOW:   A vaginal delivery is the birth of your baby through your vagina (birth canal)  AFTER YOU LEAVE:   Medicines:  · NSAIDs  help decrease swelling and pain or fever  This medicine is available with or without a doctor's order  NSAIDs can cause stomach bleeding or kidney problems in certain people  If you take blood thinner medicine, always ask your healthcare provider if NSAIDs are safe for you  Always read the medicine label and follow directions  · Take your medicine as directed  Call your healthcare provider if you think your medicine is not helping or if you have side effects  Tell him if you are allergic to any medicine  Keep a list of the medicines, vitamins, and herbs you take  Include the amounts, and when and why you take them  Bring the list or the pill bottles to follow-up visits  Carry your medicine list with you in case of an emergency  Follow up with your primary healthcare provider:  Most women need to return 6 weeks after a vaginal delivery  Ask about how to care for your wounds or stitches  Write down your questions so you remember to ask them during your visits  Activity:  Rest as much as possible  Try to keep all activities short  You may be able to do some exercise soon after you have your baby  Talk with your primary healthcare provider before you start exercising  If you work outside the home, ask when you can return to your job  Kegel exercises:  Kegel exercises may help your vaginal and rectal muscles heal faster  You can do Kegel exercises by tightening and relaxing the muscles around your vagina  Kegel exercises help make the muscles stronger  Breast care:  When your milk comes in, your breasts may feel full and hard  Ask how to care for your breasts, even if you are not breastfeeding  Constipation:  Do not try to push the bowel movement out if it is too hard   High-fiber foods, extra liquids, and regular exercise can help you prevent constipation  Examples of high-fiber foods are fruit and bran  Prune juice and water are good liquids to drink  Regular exercise helps your digestive system work  You may also be told to take over-the-counter fiber and stool softener medicines  Take these items as directed  Hemorrhoids:  Pregnancy can cause severe hemorrhoids  You may have rectal pain because of the hemorrhoids  Ask how to prevent or treat hemorrhoids  Perineum care: Your perineum is the area between your vagina and anus  Keep the area clean and dry to help it heal and to prevent infection  Wash the area gently with soap and water when you bathe or shower  Rinse your perineum with warm water when you use the toilet  Your primary healthcare provider may suggest you use a warm sitz bath to help decrease pain  A sitz bath is a bathtub or basin filled to hip level  Stay in the sitz bath for 20 to 30 minutes, or as directed  Vaginal discharge: You will have vaginal discharge, called lochia, after your delivery  The lochia is bright red the first day or two after the birth  By the fourth day, the amount decreases, and it turns red-brown  Use a sanitary pad rather than a tampon to prevent a vaginal infection  It is normal to have lochia up to 8 weeks after your baby is born  Monthly periods: Your period may start again within 7 to 12 weeks after your baby is born  If you are breastfeeding, it may take longer for your period to start again  You can still get pregnant again even though you do not have your monthly period  Talk with your primary healthcare provider about a birth control method that will be good for you if you do not want to get pregnant  Mood changes: Many new mothers have some kind of mood changes after delivery  Some of these changes occur because of lack of sleep, hormone changes, and caring for a new baby  Some mood changes can be more serious, such as postpartum depression   Talk with your primary healthcare provider if you feel unable to care for yourself or your baby  Sexual activity:  You may need to avoid sex for 6 to 7 weeks after you have your baby  You may notice you have a decreased desire for sex, or sex may be painful  You may need to use a vaginal lubricant (gel) to help make sex more comfortable  Contact your primary healthcare provider if:   · You have heavy vaginal bleeding that fills 1 or more sanitary pads in 1 hour  · You have a fever  · Your pain does not go away, or gets worse  · The skin between your vagina and rectum is swollen, warm, or red  · You have swollen, hard, or painful breasts  · You feel very sad or depressed  · You feel more tired than usual      · You have questions or concerns about your condition or care  Seek care immediately or call 911 if:   · You have pus or yellow drainage coming from your vagina or wound  · You are urinating very little, or not at all  · Your arm or leg feels warm, tender, and painful  It may look swollen and red  · You feel lightheaded, have sudden and worsening chest pain, or trouble breathing  You may have more pain when you take deep breaths or cough, or you may cough up blood  © 2014 [address] is for End User's use only and may not be sold, redistributed or otherwise used for commercial purposes  All illustrations and images included in CareNotes® are the copyrighted property of [employer]  or Blayne Garcai  The above information is an  only  It is not intended as medical advice for individual conditions or treatments  Talk to your doctor, nurse or pharmacist before following any medical regimen to see if it is safe and effective for you  Postpartum Perineal Care   WHAT YOU NEED TO KNOW:   Postpartum perineal care is care for your perineum after you have a baby  The perineum is your vagina and anus     DISCHARGE INSTRUCTIONS:   Care for your perineum:  Healthcare providers will give you a small squirt bottle and show you how to use it  Do the following after you use the toilet and before you put on a new pad:  · Remove the soiled pad    · Use the squirt bottle to rinse your perineum from front to back while you sit on the toilet     · Pat the area dry from front to back with toilet paper or a cotton cloth     · Put on a fresh pad    · Wash your hands  Decrease pain:  Ask your healthcare provider about these and other ways to decrease perineal pain:  · Sitz baths:  Healthcare providers may give you a portable sitz bath  This is a small tub that fits in the toilet  Fill the sitz bath or bathtub with 4 to 6 inches of warm water  Sit in the warm water for 20 minutes 2 to 3 times a day  · Ice:  Ice helps decrease swelling and pain  Ice may also help prevent tissue damage  Use an ice pack, or put crushed ice in a plastic bag  Cover it with a towel and place it on your perineum for 15 to 20 minutes every hour, or as directed  · Medicine spray, wipes, or pads:  Healthcare providers may give you a medicine spray or wipes soaked with numbing medicine to decrease the pain  Pads that contain an herb called witch hazel may also help reduce pain  Use these after perineal care or a sitz bath  Follow up with your healthcare provider as directed:  Write down your questions so you remember to ask them during your visits  Contact your healthcare provider if:   · You have heavy vaginal bleeding that fills 1 or more sanitary pads in 1 hour  · You have foul-smelling vaginal discharge  · You feel weak or lightheaded  · You have questions or concerns about your condition or care  Seek care immediately or call 911 if:   · You have large blood clots or bright red blood coming from your vagina  · You have abdominal pain, vomiting, and a fever    © 2017 [address] is for End User's use only and may not be sold, redistributed or otherwise used for commercial purposes  All illustrations and images included in CareNotes® are the copyrighted property of A [employer] M [employer] Inc  or Blayne Garcia  The above information is an  only  It is not intended as medical advice for individual conditions or treatments  Talk to your doctor, nurse or pharmacist before following any medical regimen to see if it is safe and effective for you

## 2019-03-01 NOTE — ANESTHESIA PREPROCEDURE EVALUATION
Review of Systems/Medical History  Patient summary reviewed  Chart reviewed      Cardiovascular  Negative cardio ROS    Pulmonary  Negative pulmonary ROS Asthma , well controlled/ stable ,        GI/Hepatic  Negative GI/hepatic ROS          Negative  ROS        Endo/Other  Negative endo/other ROS      GYN  Currently pregnant ,          Hematology  Negative hematology ROS      Musculoskeletal  Negative musculoskeletal ROS        Neurology  Negative neurology ROS      Psychology   Negative psychology ROS              Physical Exam    Airway    Mallampati score: III  TM Distance: >3 FB  Neck ROM: full     Dental   No notable dental hx     Cardiovascular  Comment: Negative ROS, Cardiovascular exam normal    Pulmonary  Pulmonary exam normal     Other Findings        Anesthesia Plan  ASA Score- 3     Anesthesia Type- epidural with ASA Monitors  Additional Monitors:   Airway Plan:         Plan Factors-    Induction-     Postoperative Plan-     Informed Consent- Anesthetic plan and risks discussed with patient

## 2019-03-01 NOTE — ANESTHESIA PROCEDURE NOTES
Epidural Block    Patient location during procedure: OB  Start time: 3/1/2019 6:46 AM  Reason for block: at surgeon's request and primary anesthetic  Staffing  Anesthesiologist: Jacques Wilkerson DO  Performed: anesthesiologist   Preanesthetic Checklist  Completed: patient identified, surgical consent, pre-op evaluation, timeout performed, IV checked, risks and benefits discussed and monitors and equipment checked  Epidural  Patient position: sitting  Prep: Betadine  Patient monitoring: frequent blood pressure checks  Approach: midline  Location: lumbar (1-5)  Injection technique: YAZ saline  Needle  Needle type: Tuohy   Needle gauge: 18 G  Catheter type: side hole  Catheter size: 20 G  Catheter at skin depth: 12 cm  Test dose: negativelidocaine (XYLOCAINE) 1 % infiltration, 2 mLnegative aspiration for CSF, negative aspiration for heme and no paresthesia on injection  patient tolerated the procedure well with no immediate complications

## 2019-03-02 PROBLEM — O09.299 PREVIOUS STILLBIRTH OR DEMISE, ANTEPARTUM: Status: RESOLVED | Noted: 2018-07-20 | Resolved: 2019-03-02

## 2019-03-02 PROBLEM — E66.01 MORBID OBESITY WITH BODY MASS INDEX (BMI) OF 40.0 TO 49.9 (HCC): Chronic | Status: ACTIVE | Noted: 2019-03-02

## 2019-03-02 PROBLEM — O99.210 MATERNAL MORBID OBESITY, ANTEPARTUM (HCC): Status: RESOLVED | Noted: 2018-07-20 | Resolved: 2019-03-02

## 2019-03-02 PROBLEM — Z3A.39 39 WEEKS GESTATION OF PREGNANCY: Status: RESOLVED | Noted: 2019-02-27 | Resolved: 2019-03-02

## 2019-03-02 PROBLEM — E66.01 MATERNAL MORBID OBESITY, ANTEPARTUM (HCC): Status: RESOLVED | Noted: 2018-07-20 | Resolved: 2019-03-02

## 2019-03-02 LAB — GLUCOSE SERPL-MCNC: 150 MG/DL (ref 65–140)

## 2019-03-02 PROCEDURE — 82948 REAGENT STRIP/BLOOD GLUCOSE: CPT

## 2019-03-02 RX ADMIN — IBUPROFEN 600 MG: 600 TABLET ORAL at 14:37

## 2019-03-02 RX ADMIN — DOCUSATE SODIUM 100 MG: 100 CAPSULE, LIQUID FILLED ORAL at 08:12

## 2019-03-02 RX ADMIN — BENZOCAINE AND LEVOMENTHOL: 200; 5 SPRAY TOPICAL at 10:22

## 2019-03-02 RX ADMIN — DOCUSATE SODIUM 100 MG: 100 CAPSULE, LIQUID FILLED ORAL at 17:40

## 2019-03-02 RX ADMIN — IBUPROFEN 600 MG: 600 TABLET ORAL at 02:04

## 2019-03-02 RX ADMIN — IBUPROFEN 600 MG: 600 TABLET ORAL at 20:27

## 2019-03-02 RX ADMIN — IBUPROFEN 600 MG: 600 TABLET ORAL at 08:12

## 2019-03-02 RX ADMIN — WITCH HAZEL 1 PAD: 500 SOLUTION RECTAL; TOPICAL at 19:44

## 2019-03-02 NOTE — PROGRESS NOTES
Progress Note - OB/GYN   Elsi Dunn 20 y o  female MRN: [id number]  Unit/Bed#: L&D 328-01 Encounter: [id number]    Assessment:  Post partum Day #1 s/p , stable, baby in room  Shoulder dystocia - 45 secs    Plan:  1) Asthma   - Albuterol PRN    2) Continue routine post partum care   - Encourage ambulation   - Encourage breastfeeding   - Anticipate discharge home tomorrow     Subjective/Objective   Chief Complaint:     Post delivery  Patient is doing well  Lochia WNL  Pain well controlled  Subjective:     Pain: yes, cramping, improved with meds  Tolerating PO: yes  Voiding: yes  Ambulating: yes  Breastfeeding:  yes  Chest pain: no  Shortness of breath: no  Leg pain: no  Lochia: WNL    Objective:     Vitals: /70   Pulse 91   Temp 97 9 °F (36 6 °C) (Oral)   Resp 18   Ht 5' 9" (1 753 m)   Wt (!) 144 kg (318 lb)   LMP 2018   Breastfeeding? Yes   BMI 46 96 kg/m²       Intake/Output Summary (Last 24 hours) at 3/2/2019 0720  Last data filed at 3/1/2019 2200  Gross per 24 hour   Intake --   Output 2337 ml   Net -2337 ml       Lab Results   Component Value Date    WBC 10 03 2019    HGB 12 8 2019    HCT 39 3 2019    MCV 82 2019     2019       Physical Exam:     Gen: AAOx3, NAD  CV: RRR  Lungs: CTA b/l  Abd: Soft, non-tender, non-distended, no rebound or guarding  Uterine fundus firm and non-tender, 1 cm below the umbilicus     Ext: Non tender        Isabelle Daley MD  OB/GYN PGY-1  3/2/2019  7:20 AM

## 2019-03-03 VITALS
HEART RATE: 80 BPM | OXYGEN SATURATION: 98 % | RESPIRATION RATE: 18 BRPM | SYSTOLIC BLOOD PRESSURE: 147 MMHG | TEMPERATURE: 98.4 F | DIASTOLIC BLOOD PRESSURE: 93 MMHG | HEIGHT: 69 IN | WEIGHT: 293 LBS | BODY MASS INDEX: 43.4 KG/M2

## 2019-03-03 RX ORDER — ACETAMINOPHEN 325 MG/1
650 TABLET ORAL EVERY 6 HOURS PRN
Qty: 30 TABLET | Refills: 0 | Status: SHIPPED | OUTPATIENT
Start: 2019-03-03

## 2019-03-03 RX ORDER — IBUPROFEN 600 MG/1
600 TABLET ORAL EVERY 6 HOURS PRN
Qty: 30 TABLET | Refills: 0 | Status: SHIPPED | OUTPATIENT
Start: 2019-03-03

## 2019-03-03 RX ADMIN — IBUPROFEN 600 MG: 600 TABLET ORAL at 04:56

## 2019-03-03 RX ADMIN — IBUPROFEN 600 MG: 600 TABLET ORAL at 20:14

## 2019-03-03 RX ADMIN — DOCUSATE SODIUM 100 MG: 100 CAPSULE, LIQUID FILLED ORAL at 18:40

## 2019-03-03 RX ADMIN — IBUPROFEN 600 MG: 600 TABLET ORAL at 12:47

## 2019-03-03 RX ADMIN — DOCUSATE SODIUM 100 MG: 100 CAPSULE, LIQUID FILLED ORAL at 10:15

## 2019-03-03 NOTE — PLAN OF CARE
Problem: Knowledge Deficit  Goal: Patient/family/caregiver demonstrates understanding of disease process, treatment plan, medications, and discharge instructions  Description  Complete learning assessment and assess knowledge base    Interventions:  - Provide teaching at level of understanding  - Provide teaching via preferred learning methods  Outcome: Adequate for Discharge     Problem: POSTPARTUM  Goal: Experiences normal postpartum course  Description  INTERVENTIONS:  - Monitor maternal vital signs  - Assess uterine involution and lochia  Outcome: Adequate for Discharge  Goal: Appropriate maternal -  bonding  Description  INTERVENTIONS:  - Identify family support  - Assess for appropriate maternal/infant bonding   -Encourage maternal/infant bonding opportunities  - Referral to  or  as needed  Outcome: Adequate for Discharge  Goal: Establishment of infant feeding pattern  Description  INTERVENTIONS:  - Assess breast/bottle feeding  - Refer to lactation as needed  Outcome: Adequate for Discharge  Goal: Incision(s), wounds(s) or drain site(s) healing without S/S of infection  Description  INTERVENTIONS  - Assess and document risk factors for skin impairment   - Assess and document dressing, incision, wound bed, drain sites and surrounding tissue  - Initiate Nutrition services consult and/or wound management as needed  Outcome: Adequate for Discharge     Problem: PAIN - ADULT  Goal: Verbalizes/displays adequate comfort level or baseline comfort level  Description  Interventions:  - Encourage patient to monitor pain and request assistance  - Assess pain using appropriate pain scale  - Administer analgesics based on type and severity of pain and evaluate response  - Implement non-pharmacological measures as appropriate and evaluate response  - Consider cultural and social influences on pain and pain management  - Notify physician/advanced practitioner if interventions unsuccessful or patient reports new pain  Outcome: Adequate for Discharge     Problem: SAFETY ADULT  Goal: Maintain or return to baseline ADL function  Description  INTERVENTIONS:  -  Assess patient's ability to carry out ADLs; assess patient's baseline for ADL function and identify physical deficits which impact ability to perform ADLs (bathing, care of mouth/teeth, toileting, grooming, dressing, etc )  - Assess/evaluate cause of self-care deficits   - Assess range of motion  - Assess patient's mobility; develop plan if impaired  - Assess patient's need for assistive devices and provide as appropriate  - Encourage maximum independence but intervene and supervise when necessary  ¯ Involve family in performance of ADLs  ¯ Assess for home care needs following discharge   ¯ Provide patient education as appropriate   Outcome: Adequate for Discharge  Goal: Maintain or return mobility status to optimal level  Description  INTERVENTIONS:  - Assess patient's baseline mobility status (ambulation, transfers, stairs, etc )    - Identify cognitive and physical deficits and behaviors that affect mobility  - [place] fall precautions as indicated by assessment  - Record patient progress and toleration of activity level on Mobility SBAR; progress patient to next Phase/Stage  - Instruct patient to call for assistance with activity based on assessment   Outcome: Adequate for Discharge     Problem: DISCHARGE PLANNING  Goal: Discharge to home or other facility with appropriate resources  Description  INTERVENTIONS:  - Identify barriers to discharge w/patient and caregiver  - Arrange for needed discharge resources and transportation as appropriate  - Identify discharge learning needs (meds, wound care, etc   - Refer to Case Management Department for coordinating discharge planning if the patient needs post-hospital services based on physician/advanced practitioner order or complex needs related to functional status, cognitive ability, or social support system   Outcome: Adequate for Discharge

## 2019-03-03 NOTE — NURSING NOTE
2000 Provided maternal and  discharge teaching  Pt verbalized understanding  Will continue to monitor

## 2019-03-03 NOTE — NURSING NOTE
2015 Pt c/o dizziness  VSS  Blood sugar WNL  Dr Osman Pimentel aware  New order for continuous pulse ox  Will continue to monitor

## 2019-03-03 NOTE — LACTATION NOTE
This note was copied from a baby's chart  Met with mother to go over feeding log since birth for the first week  Emphasized 8 or more (12) feedings in a 24 hour period, what to expect for the number of diapers per day of life and the progression of properties of the  stooling pattern  Discussed s/s that breastfeeding is going well after day 4 and when to get help from a pediatrician or lactation support person after day 4  Booklet included Breast Pumping Instructions, When You Go Back to Work or School, and Breastfeeding Resources for after discharge including access to the number for the [employer]  Reviewed waking baby strategies, changing diaper, rubbing palms of hands etc  Assisted with latch in football position on left breast  Mom expressed comfort with latch  Encouraged parents to call for assistance, questions, and concerns about breastfeeding  Extension provided

## 2019-03-03 NOTE — PROGRESS NOTES
S:  Contacted by nurse because patient c/o of dizziness  In to see patient, and she reports new onset of lightheadedness and blurry vision that started about 15 mins prior to notifying nurse  Pt reports vision changes are improving, but that she feels "like my body is throbbing" and that she feels her heart racing  She just used her albuterol inhaler for mild shortness of breath, reports this is improved  She also c/o a tingling feeling around her mouth/lips when the symptoms started  O:  Vitals:    19   BP: 122/75   Pulse: 93   Resp: 18   Temp: 98 7 °F (37 1 °C)     Cardio: Mild tachycardia, normal rhythm  Pulm: CTAB  Ext: no signs of DVT  Lochia WNL    Blood sugar - 150    A/P:  22yo PPD#1 s/p uncomplicated   New onset dizziness and blurry vision  VSS  Blood sugar WNL  Reassuring physical exam, lochia WNL   Will continue to closely monitor, and will order EKG if symptoms persist

## 2019-03-03 NOTE — PLAN OF CARE
Problem: POSTPARTUM  Goal: Experiences normal postpartum course  Description  INTERVENTIONS:  - Monitor maternal vital signs  - Assess uterine involution and lochia  3/3/2019 1321 by Les Foley RN  Outcome: Adequate for Discharge  3/3/2019 0723 by Les Foley RN  Outcome: Adequate for Discharge  Goal: Appropriate maternal -  bonding  Description  INTERVENTIONS:  - Identify family support  - Assess for appropriate maternal/infant bonding   -Encourage maternal/infant bonding opportunities  - Referral to  or  as needed  3/3/2019 1321 by Les Foley RN  Outcome: Adequate for Discharge  3/3/2019 0723 by Les Foley RN  Outcome: Adequate for Discharge  Goal: Establishment of infant feeding pattern  Description  INTERVENTIONS:  - Assess breast/bottle feeding  - Refer to lactation as needed  3/3/2019 1321 by Les Foley RN  Outcome: Adequate for Discharge  3/3/2019 0723 by Les Foley RN  Outcome: Adequate for Discharge  Goal: Incision(s), wounds(s) or drain site(s) healing without S/S of infection  Description  INTERVENTIONS  - Assess and document risk factors for skin impairment   - Assess and document dressing, incision, wound bed, drain sites and surrounding tissue  - Initiate Nutrition services consult and/or wound management as needed  3/3/2019 1321 by Les Foley RN  Outcome: Adequate for Discharge  3/3/2019 0723 by Les Foley RN  Outcome: Adequate for Discharge     Problem: PAIN - ADULT  Goal: Verbalizes/displays adequate comfort level or baseline comfort level  Description  Interventions:  - Encourage patient to monitor pain and request assistance  - Assess pain using appropriate pain scale  - Administer analgesics based on type and severity of pain and evaluate response  - Implement non-pharmacological measures as appropriate and evaluate response  - Consider cultural and social influences on pain and pain management  - Notify physician/advanced practitioner if interventions unsuccessful or patient reports new pain  3/3/2019 1321 by Chio Reyna RN  Outcome: Adequate for Discharge  3/3/2019 0723 by Chio Reyna RN  Outcome: Adequate for Discharge     Problem: SAFETY ADULT  Goal: Maintain or return to baseline ADL function  Description  INTERVENTIONS:  -  Assess patient's ability to carry out ADLs; assess patient's baseline for ADL function and identify physical deficits which impact ability to perform ADLs (bathing, care of mouth/teeth, toileting, grooming, dressing, etc )  - Assess/evaluate cause of self-care deficits   - Assess range of motion  - Assess patient's mobility; develop plan if impaired  - Assess patient's need for assistive devices and provide as appropriate  - Encourage maximum independence but intervene and supervise when necessary  ¯ Involve family in performance of ADLs  ¯ Assess for home care needs following discharge   ¯ Provide patient education as appropriate   3/3/2019 1321 by Chio Reyna RN  Outcome: Adequate for Discharge  3/3/2019 0723 by Chio Reyna RN  Outcome: Adequate for Discharge

## 2019-03-03 NOTE — PROGRESS NOTES
Progress Note - OB/GYN   Juan David Ba 20 y o  female MRN: [id number]  Unit/Bed#: L&D 307-01 Encounter: [id number]    Assessment:  Post partum Day #2 s/p , stable, baby in room  Shoulder dystocia - 45 secs    Plan:  1) Asthma   - Albuterol PRN    2) Dizziness/blurry vision   - Resolved since last night   - VSS remain stable    3) Continue routine post partum care   - Encourage ambulation   - Encourage breastfeeding   - Anticipate discharge home today    Subjective/Objective   Chief Complaint:     Post delivery  Patient is doing well  Lochia WNL  Pain well controlled  She denies feeling dizzy or having blurred vision  Subjective:     Pain: yes, cramping, improved with meds  Tolerating PO: yes  Voiding: yes  Ambulating: yes  Breastfeeding:  yes  Chest pain: no  Shortness of breath: no  Leg pain: no  Lochia: WNL    Objective:     Vitals: /73 (BP Location: Right arm)   Pulse 86   Temp 97 9 °F (36 6 °C) (Oral)   Resp 20   Ht 5' 9" (1 753 m)   Wt (!) 144 kg (318 lb)   LMP 2018   SpO2 97%   Breastfeeding? Yes   BMI 46 96 kg/m²     No intake or output data in the 24 hours ending 19 0703    Lab Results   Component Value Date    WBC 10 03 2019    HGB 12 8 2019    HCT 39 3 2019    MCV 82 2019     2019       Physical Exam:     Gen: AAOx3, NAD  CV: RRR  Lungs: CTA b/l  Abd: Soft, non-tender, non-distended, no rebound or guarding  Uterine fundus firm and non-tender, 1 cm below the umbilicus     Ext: Non tender        Farheen Babcock MD  OB/GYN PGY-1  3/3/2019  7:03 AM

## 2019-03-09 LAB — PLACENTA IN STORAGE: NORMAL

## 2019-09-09 PROCEDURE — G0145 SCR C/V CYTO,THINLAYER,RESCR: HCPCS | Performed by: OBSTETRICS & GYNECOLOGY

## 2019-09-11 ENCOUNTER — LAB REQUISITION (OUTPATIENT)
Dept: LAB | Facility: HOSPITAL | Age: 24
End: 2019-09-11
Payer: COMMERCIAL

## 2019-09-11 DIAGNOSIS — Z01.419 ENCOUNTER FOR GYNECOLOGICAL EXAMINATION WITHOUT ABNORMAL FINDING: ICD-10-CM

## 2019-09-16 LAB
LAB AP GYN PRIMARY INTERPRETATION: NORMAL
Lab: NORMAL